# Patient Record
Sex: MALE | Race: WHITE | Employment: FULL TIME | URBAN - METROPOLITAN AREA
[De-identification: names, ages, dates, MRNs, and addresses within clinical notes are randomized per-mention and may not be internally consistent; named-entity substitution may affect disease eponyms.]

---

## 2024-10-15 ENCOUNTER — TELEPHONE (OUTPATIENT)
Dept: ORTHOPEDICS CLINIC | Facility: CLINIC | Age: 64
End: 2024-10-15

## 2024-10-15 DIAGNOSIS — M25.511 RIGHT SHOULDER PAIN, UNSPECIFIED CHRONICITY: Primary | ICD-10-CM

## 2024-10-15 NOTE — TELEPHONE ENCOUNTER
XR ordered per ortho protocol. XR scheduled and patient was notified via phone call to let them know that they should arrive 15-20 minutes early, in order for them to complete imaging.     Advised patient to bring in imaging disk he verbalized understanding if he cannot obtain the disk he will check in on the first floor to get new imaging completed

## 2024-10-15 NOTE — TELEPHONE ENCOUNTER
Patient is scheduled for right shoulder. No x-rays in Epic. Please advise if imaging is needed.  Future Appointments   Date Time Provider Department Center   10/21/2024  1:40 PM Roberta Dorantes PA-C [facility]  [facility]

## 2024-10-21 ENCOUNTER — HOSPITAL ENCOUNTER (OUTPATIENT)
Dept: GENERAL RADIOLOGY | Age: 64
Discharge: HOME | End: 2024-10-21
Attending: PHYSICIAN ASSISTANT
Payer: MEDICAID

## 2024-10-21 ENCOUNTER — OFFICE VISIT (OUTPATIENT)
Dept: ORTHOPEDICS CLINIC | Facility: CLINIC | Age: 64
End: 2024-10-21
Payer: MEDICAID

## 2024-10-21 VITALS — HEIGHT: 69 IN | BODY MASS INDEX: 32.58 KG/M2 | WEIGHT: 220 LBS

## 2024-10-21 DIAGNOSIS — M75.01 ADHESIVE CAPSULITIS OF RIGHT SHOULDER: ICD-10-CM

## 2024-10-21 DIAGNOSIS — M25.511 RIGHT SHOULDER PAIN, UNSPECIFIED CHRONICITY: ICD-10-CM

## 2024-10-21 DIAGNOSIS — S46.911A STRAIN OF RIGHT SHOULDER, INITIAL ENCOUNTER: Primary | ICD-10-CM

## 2024-10-21 PROCEDURE — 99204 OFFICE O/P NEW MOD 45 MIN: CPT | Performed by: PHYSICIAN ASSISTANT

## 2024-10-21 PROCEDURE — 73030 X-RAY EXAM OF SHOULDER: CPT | Performed by: PHYSICIAN ASSISTANT

## 2024-10-21 RX ORDER — HYDROCODONE BITARTRATE AND ACETAMINOPHEN 5; 325 MG/1; MG/1
1 TABLET ORAL EVERY 6 HOURS PRN
COMMUNITY
Start: 2024-10-14

## 2024-10-21 RX ORDER — IBUPROFEN 800 MG/1
1 TABLET, FILM COATED ORAL EVERY 6 HOURS PRN
COMMUNITY
Start: 2024-10-14

## 2024-10-21 NOTE — PROGRESS NOTES
[facility] New Patient Note    CC: Right shoulder injury, DOI 10/14/2024    HPI: This 63 year old male presents today with complaints of right shoulder injury.  Onset of symptoms started approximately 1 week ago when he was on a ladder and using a chainsaw to cut down a tree branch.  He states that he lost his balance and fell about 12 feet from the ladder and landed on an abducted right arm.  He was seen and evaluated at the emergency department at which time x-rays were completed and were negative for fracture.  He was advised to follow-up with orthopedics.  Currently pain is localized to the posterior and lateral aspect of the shoulder and is weak worse with any reaching away from his body or sleeping at night.  He does note associated weakness and difficulty with using the arm.  He does have a history of a left rotator cuff repair by Dr. Irwin which is doing well.  He states that symptoms feel similar in nature compared to the right shoulder injury prior to his surgery.  He denies numbness and tingling but does have some associated neck pain as well.  For treatment he has tried ibuprofen with no improvement in symptoms.  He is here for further evaluation.    History reviewed. No pertinent past medical history.  History reviewed. No pertinent surgical history.  Current Outpatient Medications   Medication Sig Dispense Refill    HYDROcodone-acetaminophen 5-325 MG Oral Tab Take 1 tablet by mouth every 6 (six) hours as needed.      ibuprofen 800 MG Oral Tab Take 1 tablet (800 mg total) by mouth every 6 (six) hours as needed.       Allergies[1]  History reviewed. No pertinent family history.  Social History     Occupational History    Not on file   Tobacco Use    Smoking status: Never    Smokeless tobacco: Never   Vaping Use    Vaping status: Never Used   Substance and Sexual Activity    Alcohol use: Not on file    Drug use: Not on file    Sexual activity: Not on file        ROS:  Complete review of symptoms was  reviewed and is non-contributory to the chief complaint as mentioned above.      Physical Exam: He is a pleasant 63-year-old male in no acute distress.  Exam of the right shoulder and upper extremity reveals that he is tender to palpation over the AC joint, lateral acromion, biceps insertion and posterior capsule.  He has forward elevation to 140 degrees.  External rotation to 45 degrees.  Internal rotation to the back pocket.  He has pain to the impingement zone and a positive Lopez test.  Mild weakness on resisted external rotation with discomfort.  No weakness but discomfort on empty can testing.  He has a hard endpoint on passive internal and external rotation.  Sensation is present to light touch.        Imaging: I personally viewed, independently interpreted and radiology report read.  They show:  XR SHOULDER, COMPLETE (MIN 2 VIEWS), RIGHT (CPT=73030)    Result Date: 10/21/2024  PROCEDURE: XR SHOULDER, COMPLETE (MIN 2 VIEWS), RIGHT (CPT=73030)  COMPARISON: None.  INDICATIONS: Right shoulder pain and decreased range of motion 1 week post injury.  TECHNIQUE: Four views were obtained. FINDINGS:  Bone mineralization is normal.  There are slight degenerative changes within the right shoulder manifested by minimal bony hypertrophy, articular space narrowing, and subarticular sclerosis.  These are most significant at the acromioclavicular articulation.  There is a slight increased space between the humeral head and the coracoid process which may represent a small joint effusion.  The visualized aspects of the right lung appear grossly normal.  Impression:  Slight degenerative changes within the right shoulder with possible joint effusion.  No acute or destructive bony process is seen.    Dictated by (CST): Paolo Kline MD on 10/21/2024 at 1:37 PM     Finalized by (CST): Paolo Kline MD on 10/21/2024 at 1:44 PM                 Assessment: Right shoulder strain and early adhesive capsulitis      Plan: I  discussed x-ray and exam findings with the patient show no acute fracture or dislocation.  I explained that he most likely sustained a shoulder strain and does show some early signs of stiffness.  He has mild weakness on exam and due to his fall from a significant height on an abducted shoulder, I recommend further imaging with an MRI scan of the right shoulder.  I also encouraged him to stretch daily to avoid stiffness.  Exercises were shown.  He will take oral anti-inflammatories as needed and tolerated.  He will follow-up with me once MRI is available to review to discuss next steps including possible physical therapy versus surgical intervention depending on if his rotator cuff is torn.  He will follow-up sooner with questions, concerns or worsening symptoms in the interim.        Roberta Dorantes PA-C  Orthopedic Surgery   [address] [place] [id number]   t: [phone]  f: [phone]           This document was partially prepared using Dragon Medical voice recognition software.  Although every attempt is made to correct errors during dictation, discrepancies may still exist. Please contact me with any questions or clarifications.         [1] Not on File

## 2024-11-06 ENCOUNTER — HOSPITAL ENCOUNTER (OUTPATIENT)
Dept: MRI IMAGING | Facility: HOSPITAL | Age: 64
Discharge: HOME | End: 2024-11-06
Attending: PHYSICIAN ASSISTANT
Payer: MEDICAID

## 2024-11-06 DIAGNOSIS — S46.911A STRAIN OF RIGHT SHOULDER, INITIAL ENCOUNTER: ICD-10-CM

## 2024-11-06 DIAGNOSIS — M75.01 ADHESIVE CAPSULITIS OF RIGHT SHOULDER: ICD-10-CM

## 2024-11-06 PROCEDURE — 73221 MRI JOINT UPR EXTREM W/O DYE: CPT | Performed by: PHYSICIAN ASSISTANT

## 2024-11-07 ENCOUNTER — TELEPHONE (OUTPATIENT)
Dept: ORTHOPEDICS CLINIC | Facility: CLINIC | Age: 64
End: 2024-11-07

## 2024-11-07 NOTE — TELEPHONE ENCOUNTER
Please offer patient MRI follow up appt with Roberta. She will review results in office.           Roberta Dorantes PA-C  11/7/2024  8:22 AM CST Back to Top      Results reviewed please have patient follow up in office to discuss results thank you

## 2024-11-11 NOTE — TELEPHONE ENCOUNTER
Roberta Dorantes PA-C to [facility]       11/11/24 11:37 AM  We should see him in office due to the fracture seen on MRI thank you  Sunni Russo, MOOK to Roberta Dorantes PA-C         11/11/24 11:16 AM  Can we schedule him for a phone visit or do you need to see in office?

## 2024-11-11 NOTE — TELEPHONE ENCOUNTER
Per sandie, please have patient follow-up in clinic to review the MRI.  Sandie does not want to do a tele visit or phone visit.  Thank you!

## 2024-11-11 NOTE — TELEPHONE ENCOUNTER
Patient called in and requested if he can go over results via video visit or phone.    Please advise.

## 2024-11-13 NOTE — TELEPHONE ENCOUNTER
Called patient to discuss MRI results.  I explained that he has partial-thickness tear of the supraspinatus and subscapularis but no full-thickness tear.  He most likely has inflammation of the rotator cuff due to his fall on an abducted arm.  I also explained that he has some bone marrow edema and a possible nondisplaced fracture of the coracoid.  He will continue with rest, gentle stretching exercises and avoidance of activity away from his body or overhead.  He is taking naproxen and Norco.  He states this is not significantly improving symptoms.  He is requesting more pain medication.  I explained the vast medication will be the anti-inflammatories to help with decreasing inflammation about the shoulder.  He understands.  Advised to follow-up in the office for recheck in 4 weeks if not improving.

## 2024-11-13 NOTE — TELEPHONE ENCOUNTER
Patient is returning call and would like call back at your earliest convenience to review MRI results, please advise

## 2024-11-13 NOTE — TELEPHONE ENCOUNTER
Patient is calling back to get his MRI results.  Patient would like Roberta to call with them since he has no transportation to come into the office for the results.  Please advise.